# Patient Record
Sex: MALE | Race: WHITE | Employment: FULL TIME | URBAN - METROPOLITAN AREA
[De-identification: names, ages, dates, MRNs, and addresses within clinical notes are randomized per-mention and may not be internally consistent; named-entity substitution may affect disease eponyms.]

---

## 2018-12-12 ENCOUNTER — HOSPITAL PATHOLOGY (OUTPATIENT)
Dept: OTHER | Facility: CLINIC | Age: 48
End: 2018-12-12

## 2018-12-14 LAB — COPATH REPORT: NORMAL

## 2019-12-09 ENCOUNTER — HEALTH MAINTENANCE LETTER (OUTPATIENT)
Age: 49
End: 2019-12-09

## 2020-04-09 ENCOUNTER — MEDICAL CORRESPONDENCE (OUTPATIENT)
Dept: HEALTH INFORMATION MANAGEMENT | Facility: CLINIC | Age: 50
End: 2020-04-09

## 2020-04-09 DIAGNOSIS — Z98.890 HISTORY OF AORTIC ROOT REPAIR: Primary | ICD-10-CM

## 2020-10-02 ENCOUNTER — HOSPITAL ENCOUNTER (OUTPATIENT)
Dept: CARDIOLOGY | Facility: CLINIC | Age: 50
Discharge: HOME | End: 2020-10-02
Attending: FAMILY MEDICINE | Admitting: FAMILY MEDICINE
Payer: COMMERCIAL

## 2020-10-02 DIAGNOSIS — Z98.890 HISTORY OF AORTIC ROOT REPAIR: ICD-10-CM

## 2020-10-02 PROCEDURE — 999N000208 ECHOCARDIOGRAM COMPLETE

## 2020-10-02 PROCEDURE — 255N000002 HC RX 255 OP 636: Performed by: FAMILY MEDICINE

## 2020-10-02 PROCEDURE — 93306 TTE W/DOPPLER COMPLETE: CPT | Mod: 26 | Performed by: INTERNAL MEDICINE

## 2020-10-02 RX ADMIN — HUMAN ALBUMIN MICROSPHERES AND PERFLUTREN 9 ML: 10; .22 INJECTION, SOLUTION INTRAVENOUS at 09:52

## 2021-01-15 ENCOUNTER — HEALTH MAINTENANCE LETTER (OUTPATIENT)
Age: 51
End: 2021-01-15

## 2021-10-24 ENCOUNTER — HEALTH MAINTENANCE LETTER (OUTPATIENT)
Age: 51
End: 2021-10-24

## 2022-02-13 ENCOUNTER — HEALTH MAINTENANCE LETTER (OUTPATIENT)
Age: 52
End: 2022-02-13

## 2022-02-24 ENCOUNTER — ANCILLARY PROCEDURE (OUTPATIENT)
Dept: GENERAL RADIOLOGY | Facility: CLINIC | Age: 52
End: 2022-02-24
Attending: ORTHOPAEDIC SURGERY
Payer: COMMERCIAL

## 2022-02-24 DIAGNOSIS — S86.011A ACHILLES TENDON TEAR, RIGHT, INITIAL ENCOUNTER: Primary | ICD-10-CM

## 2022-02-24 DIAGNOSIS — S86.011A ACHILLES TENDON TEAR, RIGHT, INITIAL ENCOUNTER: ICD-10-CM

## 2022-02-24 PROCEDURE — 73620 X-RAY EXAM OF FOOT: CPT | Mod: RT | Performed by: RADIOLOGY

## 2022-03-01 ENCOUNTER — VIRTUAL VISIT (OUTPATIENT)
Dept: ORTHOPEDICS | Facility: CLINIC | Age: 52
End: 2022-03-01
Payer: COMMERCIAL

## 2022-03-01 DIAGNOSIS — M25.571 PAIN IN JOINT, ANKLE AND FOOT, RIGHT: Primary | ICD-10-CM

## 2022-03-01 PROCEDURE — 99204 OFFICE O/P NEW MOD 45 MIN: CPT | Mod: 95 | Performed by: ORTHOPAEDIC SURGERY

## 2022-03-01 ASSESSMENT — ENCOUNTER SYMPTOMS
NECK PAIN: 0
BACK PAIN: 0
STIFFNESS: 1
JOINT SWELLING: 0
ARTHRALGIAS: 1
MUSCLE CRAMPS: 0
MYALGIAS: 0
MUSCLE WEAKNESS: 0

## 2022-03-01 NOTE — PROGRESS NOTES
TELEPHONE VISIT    CHIEF COMPLAINT:  Right heel pain.    HISTORY OF PRESENT ILLNESS:  Mr. Mathis was interviewed via phone, although he was also examined on the previous day.  The patient reports to have a 6-month history of pain and discomfort along the posterior aspect of the heel.  He in fact reports to have pain in both heels.  However, the right one seems to be much worse than the left one.  Patient denies any history of trauma.  He has been aware of having some prominence on the posterior aspect of the heel.    He has tried conservative treatment options in the form of ice and activity modifications and anti-inflammatory medication without any significant success.  The patient reports to have pain pretty much on a daily basis as well as to have any interference of work as any degree of rise will create a tremendous amount of discomfort to restart and resume activities.    He enjoys fishing and hunting for recreational purposes, which also seems to interfere with his ability to do activities.  Reports to recently have gone on a trip to [place] and to have had a fair amount of pain and discomfort the following day after doing some walking at the beach.  The patient is interested in undergoing a definitive solution even if that implies a surgical intervention.    PAST MEDICAL HISTORY:  Overweight.    PAST SURGICAL HISTORY:  Reviewed today.    DRUG ALLERGIES:  None.    CURRENT MEDICATIONS:  Please refer to encounter form.    PHYSICAL EXAMINATION:  On today's visit, he presents as a very pleasant male in no apparent distress.  Denies to have any constitutional symptoms.    As mentioned above, I will report on exam performed the previous day, which is significant for having full range of motion of the right ankle, hindfoot and midfoot joints.  CMS intact.  Skin is intact.  Presents with no pain with palpation of Achilles tendon.  There is a fair amount of pain and discomfort with palpation of the posterior aspect of  the calcaneus tuberosity where the osteophyte is located.  Forefoot exam is unremarkable.    IMAGING:  Plain x-rays from a previous visit were reviewed today, which were significant for showing 3 views of the right foot with a fairly prominent osteophyte across the posterior aspect of the calcaneus tuberosity.  Otherwise, there are no acute findings, although he presented with some diffuse osteoarthritis across the midfoot region as well.    ASSESSMENT:  Right Achilles insertional calcific tendinitis.    PLAN:  I discussed with the patient that at this point, given the size of a osteophyte, the most reasonable approach will consist of undergoing an Achilles tendon debridement with partial calcaneus excision and subsequent reattachment.  I discussed with him the most likely postoperative course and complications from such intervention, which include but are not limited to infection, bleeding, nerve damage, residual pain and stiffness.    All questions were answered.  Patient was pleased with the discussion.  The patient will schedule surgery at the best of his convenience, and in the meantime, he has no activity restrictions.    TT:  30 minutes.  CT:  20 minutes.

## 2022-03-01 NOTE — LETTER
3/1/2022         RE: Sheldon Mathis  [address]  [facility] [address]        Dear Colleague,    Thank you for referring your patient, Sheldon Mathis, to the [facility] [place]. Please see a copy of my visit note below.    TELEPHONE VISIT    CHIEF COMPLAINT:  Right heel pain.    HISTORY OF PRESENT ILLNESS:  Mr. Mathsi was interviewed via phone, although he was also examined on the previous day.  The patient reports to have a 6-month history of pain and discomfort along the posterior aspect of the heel.  He in fact reports to have pain in both heels.  However, the right one seems to be much worse than the left one.  Patient denies any history of trauma.  He has been aware of having some prominence on the posterior aspect of the heel.    He has tried conservative treatment options in the form of ice and activity modifications and anti-inflammatory medication without any significant success.  The patient reports to have pain pretty much on a daily basis as well as to have any interference of work as any degree of rise will create a tremendous amount of discomfort to restart and resume activities.    He enjoys fishing and hunting for recreational purposes, which also seems to interfere with his ability to do activities.  Reports to recently have gone on a trip to [place] and to have had a fair amount of pain and discomfort the following day after doing some walking at the beach.  The patient is interested in undergoing a definitive solution even if that implies a surgical intervention.    PAST MEDICAL HISTORY:  Overweight.    PAST SURGICAL HISTORY:  Reviewed today.    DRUG ALLERGIES:  None.    CURRENT MEDICATIONS:  Please refer to encounter form.    PHYSICAL EXAMINATION:  On today's visit, he presents as a very pleasant male in no apparent distress.  Denies to have any constitutional symptoms.    As mentioned above, I will report on exam performed the previous day, which  is significant for having full range of motion of the right ankle, hindfoot and midfoot joints.  CMS intact.  Skin is intact.  Presents with no pain with palpation of Achilles tendon.  There is a fair amount of pain and discomfort with palpation of the posterior aspect of the calcaneus tuberosity where the osteophyte is located.  Forefoot exam is unremarkable.    IMAGING:  Plain x-rays from a previous visit were reviewed today, which were significant for showing 3 views of the right foot with a fairly prominent osteophyte across the posterior aspect of the calcaneus tuberosity.  Otherwise, there are no acute findings, although he presented with some diffuse osteoarthritis across the midfoot region as well.    ASSESSMENT:  Right Achilles insertional calcific tendinitis.    PLAN:  I discussed with the patient that at this point, given the size of a osteophyte, the most reasonable approach will consist of undergoing an Achilles tendon debridement with partial calcaneus excision and subsequent reattachment.  I discussed with him the most likely postoperative course and complications from such intervention, which include but are not limited to infection, bleeding, nerve damage, residual pain and stiffness.    All questions were answered.  Patient was pleased with the discussion.  The patient will schedule surgery at the best of his convenience, and in the meantime, he has no activity restrictions.    TT:  30 minutes.  CT:  20 minutes.      Gokul Merchant MD

## 2022-03-08 DIAGNOSIS — M25.571 PAIN IN JOINT, ANKLE AND FOOT, RIGHT: Primary | ICD-10-CM

## 2022-03-29 ENCOUNTER — MYC MEDICAL ADVICE (OUTPATIENT)
Dept: ORTHOPEDICS | Facility: CLINIC | Age: 52
End: 2022-03-29
Payer: COMMERCIAL

## 2022-04-05 ENCOUNTER — DOCUMENTATION ONLY (OUTPATIENT)
Dept: ORTHOPEDICS | Facility: CLINIC | Age: 52
End: 2022-04-05
Payer: COMMERCIAL

## 2022-04-05 NOTE — PROGRESS NOTES
FMLA and workability forms complete and faxed to [facility] [phone]  Also scanned to pt email  Sent to be scanned into chart    Nadja Preciado

## 2022-04-24 DIAGNOSIS — Z11.59 ENCOUNTER FOR SCREENING FOR OTHER VIRAL DISEASES: Primary | ICD-10-CM

## 2022-05-02 ENCOUNTER — TRANSFERRED RECORDS (OUTPATIENT)
Dept: HEALTH INFORMATION MANAGEMENT | Facility: CLINIC | Age: 52
End: 2022-05-02
Payer: COMMERCIAL

## 2022-05-02 DIAGNOSIS — S86.011A ACHILLES TENDON TEAR, RIGHT, INITIAL ENCOUNTER: Primary | ICD-10-CM

## 2022-05-22 ENCOUNTER — LAB (OUTPATIENT)
Dept: LAB | Facility: CLINIC | Age: 52
End: 2022-05-22
Attending: ORTHOPAEDIC SURGERY
Payer: COMMERCIAL

## 2022-05-22 DIAGNOSIS — Z11.59 ENCOUNTER FOR SCREENING FOR OTHER VIRAL DISEASES: ICD-10-CM

## 2022-05-22 PROCEDURE — U0003 INFECTIOUS AGENT DETECTION BY NUCLEIC ACID (DNA OR RNA); SEVERE ACUTE RESPIRATORY SYNDROME CORONAVIRUS 2 (SARS-COV-2) (CORONAVIRUS DISEASE [COVID-19]), AMPLIFIED PROBE TECHNIQUE, MAKING USE OF HIGH THROUGHPUT TECHNOLOGIES AS DESCRIBED BY CMS-2020-01-R: HCPCS

## 2022-05-22 PROCEDURE — U0005 INFEC AGEN DETEC AMPLI PROBE: HCPCS

## 2022-05-23 LAB — SARS-COV-2 RNA RESP QL NAA+PROBE: NEGATIVE

## 2022-05-23 RX ORDER — OMEPRAZOLE 20 MG/1
20 TABLET, DELAYED RELEASE ORAL DAILY
COMMUNITY

## 2022-05-23 RX ORDER — LOSARTAN POTASSIUM AND HYDROCHLOROTHIAZIDE 25; 100 MG/1; MG/1
1 TABLET ORAL DAILY
COMMUNITY

## 2022-05-23 RX ORDER — ATORVASTATIN CALCIUM 10 MG/1
TABLET, FILM COATED ORAL
COMMUNITY
Start: 2022-04-28

## 2022-05-23 RX ORDER — METOPROLOL SUCCINATE 100 MG/1
100 TABLET, EXTENDED RELEASE ORAL DAILY
COMMUNITY
Start: 2022-02-24

## 2022-05-24 ENCOUNTER — ANESTHESIA EVENT (OUTPATIENT)
Dept: SURGERY | Facility: CLINIC | Age: 52
End: 2022-05-24
Payer: COMMERCIAL

## 2022-05-25 ENCOUNTER — ANESTHESIA (OUTPATIENT)
Dept: SURGERY | Facility: CLINIC | Age: 52
End: 2022-05-25
Payer: COMMERCIAL

## 2022-05-25 ENCOUNTER — HOSPITAL ENCOUNTER (OUTPATIENT)
Facility: CLINIC | Age: 52
Discharge: HOME | End: 2022-05-25
Attending: ORTHOPAEDIC SURGERY | Admitting: ORTHOPAEDIC SURGERY
Payer: COMMERCIAL

## 2022-05-25 VITALS
DIASTOLIC BLOOD PRESSURE: 73 MMHG | SYSTOLIC BLOOD PRESSURE: 111 MMHG | TEMPERATURE: 97.7 F | HEIGHT: 70 IN | HEART RATE: 57 BPM | WEIGHT: 308.42 LBS | BODY MASS INDEX: 44.15 KG/M2 | RESPIRATION RATE: 15 BRPM | OXYGEN SATURATION: 98 %

## 2022-05-25 DIAGNOSIS — M25.571 ACUTE RIGHT ANKLE PAIN: Primary | ICD-10-CM

## 2022-05-25 LAB — GLUCOSE BLDC GLUCOMTR-MCNC: 109 MG/DL (ref 70–99)

## 2022-05-25 PROCEDURE — 258N000003 HC RX IP 258 OP 636: Performed by: NURSE ANESTHETIST, CERTIFIED REGISTERED

## 2022-05-25 PROCEDURE — 710N000010 HC RECOVERY PHASE 1, LEVEL 2, PER MIN: Performed by: ORTHOPAEDIC SURGERY

## 2022-05-25 PROCEDURE — 250N000011 HC RX IP 250 OP 636: Performed by: NURSE ANESTHETIST, CERTIFIED REGISTERED

## 2022-05-25 PROCEDURE — 999N000141 HC STATISTIC PRE-PROCEDURE NURSING ASSESSMENT: Performed by: ORTHOPAEDIC SURGERY

## 2022-05-25 PROCEDURE — 370N000017 HC ANESTHESIA TECHNICAL FEE, PER MIN: Performed by: ORTHOPAEDIC SURGERY

## 2022-05-25 PROCEDURE — 82962 GLUCOSE BLOOD TEST: CPT

## 2022-05-25 PROCEDURE — 271N000001 HC OR GENERAL SUPPLY NON-STERILE: Performed by: ORTHOPAEDIC SURGERY

## 2022-05-25 PROCEDURE — C1713 ANCHOR/SCREW BN/BN,TIS/BN: HCPCS | Performed by: ORTHOPAEDIC SURGERY

## 2022-05-25 PROCEDURE — 250N000009 HC RX 250: Performed by: ORTHOPAEDIC SURGERY

## 2022-05-25 PROCEDURE — 28120 PART REMOVAL OF ANKLE/HEEL: CPT | Mod: RT | Performed by: ORTHOPAEDIC SURGERY

## 2022-05-25 PROCEDURE — 250N000011 HC RX IP 250 OP 636: Performed by: ORTHOPAEDIC SURGERY

## 2022-05-25 PROCEDURE — 250N000009 HC RX 250: Performed by: ANESTHESIOLOGY

## 2022-05-25 PROCEDURE — 272N000001 HC OR GENERAL SUPPLY STERILE: Performed by: ORTHOPAEDIC SURGERY

## 2022-05-25 PROCEDURE — 27630 REMOVAL OF TENDON LESION: CPT | Mod: RT | Performed by: ORTHOPAEDIC SURGERY

## 2022-05-25 PROCEDURE — 360N000084 HC SURGERY LEVEL 4 W/ FLUORO, PER MIN: Performed by: ORTHOPAEDIC SURGERY

## 2022-05-25 PROCEDURE — 250N000011 HC RX IP 250 OP 636: Performed by: STUDENT IN AN ORGANIZED HEALTH CARE EDUCATION/TRAINING PROGRAM

## 2022-05-25 PROCEDURE — 250N000011 HC RX IP 250 OP 636: Performed by: ANESTHESIOLOGY

## 2022-05-25 PROCEDURE — 250N000009 HC RX 250: Performed by: NURSE ANESTHETIST, CERTIFIED REGISTERED

## 2022-05-25 PROCEDURE — 710N000012 HC RECOVERY PHASE 2, PER MINUTE: Performed by: ORTHOPAEDIC SURGERY

## 2022-05-25 DEVICE — IMPLANTABLE DEVICE: Type: IMPLANTABLE DEVICE | Site: ANKLE | Status: FUNCTIONAL

## 2022-05-25 RX ORDER — MAGNESIUM HYDROXIDE 1200 MG/15ML
LIQUID ORAL PRN
Status: DISCONTINUED | OUTPATIENT
Start: 2022-05-25 | End: 2022-05-25 | Stop reason: HOSPADM

## 2022-05-25 RX ORDER — OXYCODONE HYDROCHLORIDE 5 MG/1
5 TABLET ORAL
Status: DISCONTINUED | OUTPATIENT
Start: 2022-05-25 | End: 2022-05-25 | Stop reason: HOSPADM

## 2022-05-25 RX ORDER — SODIUM CHLORIDE, SODIUM LACTATE, POTASSIUM CHLORIDE, CALCIUM CHLORIDE 600; 310; 30; 20 MG/100ML; MG/100ML; MG/100ML; MG/100ML
INJECTION, SOLUTION INTRAVENOUS CONTINUOUS
Status: DISCONTINUED | OUTPATIENT
Start: 2022-05-25 | End: 2022-05-25 | Stop reason: HOSPADM

## 2022-05-25 RX ORDER — DEXAMETHASONE SODIUM PHOSPHATE 10 MG/ML
INJECTION, SOLUTION INTRAMUSCULAR; INTRAVENOUS
Status: COMPLETED | OUTPATIENT
Start: 2022-05-25 | End: 2022-05-25

## 2022-05-25 RX ORDER — HYDROMORPHONE HYDROCHLORIDE 1 MG/ML
0.2 INJECTION, SOLUTION INTRAMUSCULAR; INTRAVENOUS; SUBCUTANEOUS EVERY 5 MIN PRN
Status: DISCONTINUED | OUTPATIENT
Start: 2022-05-25 | End: 2022-05-25 | Stop reason: HOSPADM

## 2022-05-25 RX ORDER — OXYCODONE HYDROCHLORIDE 5 MG/1
5-10 TABLET ORAL EVERY 4 HOURS PRN
Qty: 20 TABLET | Refills: 0 | Status: SHIPPED | OUTPATIENT
Start: 2022-05-25

## 2022-05-25 RX ORDER — OXYCODONE HYDROCHLORIDE 5 MG/1
5 TABLET ORAL EVERY 4 HOURS PRN
Status: DISCONTINUED | OUTPATIENT
Start: 2022-05-25 | End: 2022-05-25 | Stop reason: HOSPADM

## 2022-05-25 RX ORDER — ONDANSETRON 4 MG/1
4 TABLET, ORALLY DISINTEGRATING ORAL EVERY 30 MIN PRN
Status: DISCONTINUED | OUTPATIENT
Start: 2022-05-25 | End: 2022-05-25 | Stop reason: HOSPADM

## 2022-05-25 RX ORDER — FENTANYL CITRATE 50 UG/ML
25 INJECTION, SOLUTION INTRAMUSCULAR; INTRAVENOUS
Status: DISCONTINUED | OUTPATIENT
Start: 2022-05-25 | End: 2022-05-25 | Stop reason: HOSPADM

## 2022-05-25 RX ORDER — CEFAZOLIN SODIUM/WATER 3 G/30 ML
3 SYRINGE (ML) INTRAVENOUS
Status: COMPLETED | OUTPATIENT
Start: 2022-05-25 | End: 2022-05-25

## 2022-05-25 RX ORDER — CEFAZOLIN SODIUM/WATER 2 G/20 ML
2 SYRINGE (ML) INTRAVENOUS
Status: DISCONTINUED | OUTPATIENT
Start: 2022-05-25 | End: 2022-05-25

## 2022-05-25 RX ORDER — DEXMEDETOMIDINE HYDROCHLORIDE 4 UG/ML
INJECTION, SOLUTION INTRAVENOUS
Status: COMPLETED | OUTPATIENT
Start: 2022-05-25 | End: 2022-05-25

## 2022-05-25 RX ORDER — CEFAZOLIN SODIUM/WATER 2 G/20 ML
2 SYRINGE (ML) INTRAVENOUS SEE ADMIN INSTRUCTIONS
Status: DISCONTINUED | OUTPATIENT
Start: 2022-05-25 | End: 2022-05-25 | Stop reason: HOSPADM

## 2022-05-25 RX ORDER — ONDANSETRON 2 MG/ML
4 INJECTION INTRAMUSCULAR; INTRAVENOUS EVERY 30 MIN PRN
Status: DISCONTINUED | OUTPATIENT
Start: 2022-05-25 | End: 2022-05-25 | Stop reason: HOSPADM

## 2022-05-25 RX ORDER — SODIUM CHLORIDE, SODIUM LACTATE, POTASSIUM CHLORIDE, CALCIUM CHLORIDE 600; 310; 30; 20 MG/100ML; MG/100ML; MG/100ML; MG/100ML
INJECTION, SOLUTION INTRAVENOUS CONTINUOUS PRN
Status: DISCONTINUED | OUTPATIENT
Start: 2022-05-25 | End: 2022-05-25

## 2022-05-25 RX ORDER — BUPIVACAINE HYDROCHLORIDE 2.5 MG/ML
INJECTION, SOLUTION EPIDURAL; INFILTRATION; INTRACAUDAL
Status: COMPLETED | OUTPATIENT
Start: 2022-05-25 | End: 2022-05-25

## 2022-05-25 RX ORDER — FENTANYL CITRATE 50 UG/ML
25-50 INJECTION, SOLUTION INTRAMUSCULAR; INTRAVENOUS
Status: DISCONTINUED | OUTPATIENT
Start: 2022-05-25 | End: 2022-05-25 | Stop reason: HOSPADM

## 2022-05-25 RX ORDER — NALOXONE HYDROCHLORIDE 0.4 MG/ML
0.4 INJECTION, SOLUTION INTRAMUSCULAR; INTRAVENOUS; SUBCUTANEOUS
Status: DISCONTINUED | OUTPATIENT
Start: 2022-05-25 | End: 2022-05-25 | Stop reason: HOSPADM

## 2022-05-25 RX ORDER — HYDROXYZINE HYDROCHLORIDE 25 MG/1
25 TABLET, FILM COATED ORAL EVERY 8 HOURS PRN
Qty: 20 TABLET | Refills: 0 | Status: SHIPPED | OUTPATIENT
Start: 2022-05-25

## 2022-05-25 RX ORDER — PROPOFOL 10 MG/ML
INJECTION, EMULSION INTRAVENOUS PRN
Status: DISCONTINUED | OUTPATIENT
Start: 2022-05-25 | End: 2022-05-25

## 2022-05-25 RX ORDER — HYDROXYZINE HYDROCHLORIDE 25 MG/1
25 TABLET, FILM COATED ORAL
Status: DISCONTINUED | OUTPATIENT
Start: 2022-05-25 | End: 2022-05-25 | Stop reason: HOSPADM

## 2022-05-25 RX ORDER — PROPOFOL 10 MG/ML
INJECTION, EMULSION INTRAVENOUS CONTINUOUS PRN
Status: DISCONTINUED | OUTPATIENT
Start: 2022-05-25 | End: 2022-05-25

## 2022-05-25 RX ORDER — NALOXONE HYDROCHLORIDE 0.4 MG/ML
0.2 INJECTION, SOLUTION INTRAMUSCULAR; INTRAVENOUS; SUBCUTANEOUS
Status: DISCONTINUED | OUTPATIENT
Start: 2022-05-25 | End: 2022-05-25 | Stop reason: HOSPADM

## 2022-05-25 RX ORDER — ACETAMINOPHEN 325 MG/1
650 TABLET ORAL
Status: DISCONTINUED | OUTPATIENT
Start: 2022-05-25 | End: 2022-05-25 | Stop reason: HOSPADM

## 2022-05-25 RX ORDER — LIDOCAINE HYDROCHLORIDE 20 MG/ML
INJECTION, SOLUTION INFILTRATION; PERINEURAL PRN
Status: DISCONTINUED | OUTPATIENT
Start: 2022-05-25 | End: 2022-05-25

## 2022-05-25 RX ORDER — FLUMAZENIL 0.1 MG/ML
0.2 INJECTION, SOLUTION INTRAVENOUS
Status: DISCONTINUED | OUTPATIENT
Start: 2022-05-25 | End: 2022-05-25 | Stop reason: HOSPADM

## 2022-05-25 RX ORDER — GABAPENTIN 100 MG/1
300 CAPSULE ORAL
Status: DISCONTINUED | OUTPATIENT
Start: 2022-05-25 | End: 2022-05-25 | Stop reason: HOSPADM

## 2022-05-25 RX ORDER — MEPERIDINE HYDROCHLORIDE 25 MG/ML
12.5 INJECTION INTRAMUSCULAR; INTRAVENOUS; SUBCUTANEOUS
Status: DISCONTINUED | OUTPATIENT
Start: 2022-05-25 | End: 2022-05-25 | Stop reason: HOSPADM

## 2022-05-25 RX ORDER — DEXAMETHASONE SODIUM PHOSPHATE 4 MG/ML
INJECTION, SOLUTION INTRA-ARTICULAR; INTRALESIONAL; INTRAMUSCULAR; INTRAVENOUS; SOFT TISSUE PRN
Status: DISCONTINUED | OUTPATIENT
Start: 2022-05-25 | End: 2022-05-25

## 2022-05-25 RX ORDER — FENTANYL CITRATE 50 UG/ML
25 INJECTION, SOLUTION INTRAMUSCULAR; INTRAVENOUS EVERY 5 MIN PRN
Status: DISCONTINUED | OUTPATIENT
Start: 2022-05-25 | End: 2022-05-25 | Stop reason: HOSPADM

## 2022-05-25 RX ORDER — ACETAMINOPHEN 325 MG/1
975 TABLET ORAL ONCE
Status: DISCONTINUED | OUTPATIENT
Start: 2022-05-25 | End: 2022-05-25 | Stop reason: HOSPADM

## 2022-05-25 RX ORDER — LIDOCAINE 40 MG/G
CREAM TOPICAL
Status: DISCONTINUED | OUTPATIENT
Start: 2022-05-25 | End: 2022-05-25 | Stop reason: HOSPADM

## 2022-05-25 RX ADMIN — DEXAMETHASONE SODIUM PHOSPHATE 1 MG: 10 INJECTION, SOLUTION INTRAMUSCULAR; INTRAVENOUS at 06:46

## 2022-05-25 RX ADMIN — PHENYLEPHRINE HYDROCHLORIDE 100 MCG: 10 INJECTION INTRAVENOUS at 08:09

## 2022-05-25 RX ADMIN — BUPIVACAINE HYDROCHLORIDE 15 ML: 2.5 INJECTION, SOLUTION EPIDURAL; INFILTRATION; INTRACAUDAL at 06:46

## 2022-05-25 RX ADMIN — PROPOFOL 20 MG: 10 INJECTION, EMULSION INTRAVENOUS at 07:31

## 2022-05-25 RX ADMIN — CEFAZOLIN SODIUM 3 G: 10 INJECTION, POWDER, FOR SOLUTION INTRAVENOUS at 07:20

## 2022-05-25 RX ADMIN — PROPOFOL 20 MG: 10 INJECTION, EMULSION INTRAVENOUS at 07:34

## 2022-05-25 RX ADMIN — DEXAMETHASONE SODIUM PHOSPHATE 8 MG: 4 INJECTION, SOLUTION INTRAMUSCULAR; INTRAVENOUS at 07:46

## 2022-05-25 RX ADMIN — SODIUM CHLORIDE, POTASSIUM CHLORIDE, SODIUM LACTATE AND CALCIUM CHLORIDE: 600; 310; 30; 20 INJECTION, SOLUTION INTRAVENOUS at 07:20

## 2022-05-25 RX ADMIN — PHENYLEPHRINE HYDROCHLORIDE 100 MCG: 10 INJECTION INTRAVENOUS at 08:14

## 2022-05-25 RX ADMIN — MIDAZOLAM HYDROCHLORIDE 1 MG: 1 INJECTION, SOLUTION INTRAMUSCULAR; INTRAVENOUS at 06:40

## 2022-05-25 RX ADMIN — FENTANYL CITRATE 50 MCG: 50 INJECTION, SOLUTION INTRAMUSCULAR; INTRAVENOUS at 06:40

## 2022-05-25 RX ADMIN — PROPOFOL 100 MCG/KG/MIN: 10 INJECTION, EMULSION INTRAVENOUS at 07:31

## 2022-05-25 RX ADMIN — DEXMEDETOMIDINE 20 MCG: 100 INJECTION, SOLUTION, CONCENTRATE INTRAVENOUS at 06:40

## 2022-05-25 RX ADMIN — DEXAMETHASONE SODIUM PHOSPHATE 1 MG: 10 INJECTION, SOLUTION INTRAMUSCULAR; INTRAVENOUS at 06:40

## 2022-05-25 RX ADMIN — DEXMEDETOMIDINE HYDROCHLORIDE 20 MCG: 4 INJECTION, SOLUTION INTRAVENOUS at 06:46

## 2022-05-25 RX ADMIN — MIDAZOLAM 2 MG: 1 INJECTION INTRAMUSCULAR; INTRAVENOUS at 07:24

## 2022-05-25 RX ADMIN — BUPIVACAINE HYDROCHLORIDE 25 ML: 2.5 INJECTION, SOLUTION EPIDURAL; INFILTRATION; INTRACAUDAL at 06:40

## 2022-05-25 RX ADMIN — LIDOCAINE HYDROCHLORIDE 60 MG: 20 INJECTION, SOLUTION INFILTRATION; PERINEURAL at 07:31

## 2022-05-25 RX ADMIN — PHENYLEPHRINE HYDROCHLORIDE 50 MCG: 10 INJECTION INTRAVENOUS at 08:02

## 2022-05-25 NOTE — ANESTHESIA PROCEDURE NOTES
Sciatic Procedure Note    Pre-Procedure   Staff -        Anesthesiologist:  Frantz Singer DO       Performed By: anesthesiologist       Location: pre-op       Procedure Start/Stop Times: 5/25/2022 6:40 AM and 5/25/2022 6:45 AM       Pre-Anesthestic Checklist: patient identified, IV checked, site marked, risks and benefits discussed, informed consent, monitors and equipment checked, pre-op evaluation, at physician/surgeon's request and post-op pain management  Timeout:       Correct Patient: Yes        Correct Procedure: Yes        Correct Site: Yes        Correct Position: Yes        Correct Laterality: Yes        Site Marked: Yes  Procedure Documentation  Procedure: Sciatic       Diagnosis: POST OPERATIVE PAIN       Laterality: right       Patient Position: supine       Patient Prep/Sterile Barriers: sterile gloves, mask       Skin prep: Chloraprep       Local skin infiltrated with 3 mL of 1% lidocaine.  (popliteal approach).       Needle Type: short bevel       Needle Gauge: 21.        Needle Length (millimeters): 110        Ultrasound guided       1. Ultrasound was used to identify targeted nerve, plexus, vascular marker, or fascial plane and place a needle adjacent to it in real-time.       2. Ultrasound was used to visualize the spread of anesthetic in close proximity to the above referenced structure.       3. A permanent image is entered into the patient's record.       4. The visualized anatomic structures appeared normal.       5. There were no apparent abnormal pathologic findings.    Assessment/Narrative         The placement was negative for: blood aspirated, painful injection and site bleeding       Paresthesias: No.       Bolus given via needle..        Secured via.        Insertion/Infusion Method: Single Shot       Complications: none       Injection made incrementally with aspirations every 5 mL.    Medication(s) Administered   Bupivacaine 0.25% PF (Infiltration) - Infiltration   25 mL -  5/25/2022 6:40:00 AM  Dexmedetomidine 4 mcg/mL (Perineural) - Perineural   20 mcg - 5/25/2022 6:40:00 AM  Dexamethasone 10 mg/mL PF (Perineural) - Perineural   1 mg - 5/25/2022 6:40:00 AM  Medication Administration Time: 5/25/2022 6:40 AM     Comments:  Informed consent obtained.  All risks and benefits of the nerve block discussed with the patient.  All questions answered and all parties agreed with the plan.   Block was placed at the surgeon's request for post operative pain control.

## 2022-05-25 NOTE — ANESTHESIA POSTPROCEDURE EVALUATION
Patient: Sheldon Mathis    Procedure: Procedure(s):  Right achilles debridement and partial calcaneus osteotomy       Anesthesia Type:  MAC    Note:  Disposition: Outpatient   Postop Pain Control: Uneventful            Sign Out: Well controlled pain   PONV: No   Neuro/Psych: Uneventful            Sign Out: Acceptable/Baseline neuro status   Airway/Respiratory: Uneventful            Sign Out: AIRWAY IN SITU/Resp. Support   CV/Hemodynamics: Uneventful            Sign Out: Acceptable CV status   Other NRE: NONE   DID A NON-ROUTINE EVENT OCCUR? No           Last vitals:  Vitals Value Taken Time   /60 05/25/22 0900   Temp 36.8  C (98.2  F) 05/25/22 0825   Pulse 58 05/25/22 0906   Resp 9 05/25/22 0906   SpO2 96 % 05/25/22 0906   Vitals shown include unvalidated device data.    Electronically Signed By: Frantz Singer DO  May 25, 2022  9:08 AM

## 2022-05-25 NOTE — OP NOTE
Procedure Date: 05/25/2022    PREOPERATIVE DIAGNOSIS:  Right Achilles insertional calcific tendinitis.    POSTOPERATIVE DIAGNOSIS:  Right Achilles insertional calcific tendinitis.    PROCEDURE:  1.  Right Achilles tendon debridement.  2.  Right calcaneus partial excision.  3.  Right Achilles tendon reattachment.    SURGEON:  Gokul Merchant MD    ASSISTANT:  Neris Means PA-C.   Ms. Means's assistance was required in order to provide assistance with positioning, the surgery itself, holding retractors, closure of the wound and application of immobilization devices.  At the time of the surgery, there was no available help from an orthopedic trainee.    COMPLICATIONS:  None.    DRAINS:  None.    ESTIMATED BLOOD LOSS:  Less than 20 mL    ANESTHESIA:  IV sedation.    INDICATIONS FOR PROCEDURE:  Please refer to clinic note for further details.  Discussed indications of Mr. Mathis's case.    DESCRIPTION OF PROCEDURE:  On 05/25/2022, the patient was taken to surgery.  Preoperative antibiotics were administered to the patient prior to arrival to the OR.     After successful induction of IV sedation, the patient was placed supine on the operating table.  The right lower extremity was prepped and draped in sterile fashion.  After exsanguination by gravity, tourniquet cuff was inflated to 300 mmHg on the proximal third of the right thigh.    The pause for the cause was performed according to institution's policy, which confirmed laterality of the procedure.    An incision was made on the posterior midline across the right heel.  Subcutaneous tissues were dissected.  The Achilles tendon was identified and it was detached from the calcaneus tuberosity.  The Achilles tendon was quite thickened and proceed with debridement of the most anterior half of the tendon with a #10 surgical blade in order to expose healthy collagen fibers.    With an oscillating saw, we proceeded with resection of the most posterior and superior  aspect of the calcaneus tuberosity.  Special attention was placed to avoid any sharp edges across the medial and lateral wall of the calcaneus.    Eventually proceeded with placement of a 4.5 mm bioabsorbable corkscrew across the calcaneus tuberosity with a total of 8 strands of suture material.  Eventually, we proceeded with reattachment of Achilles tendon to the calcaneus tuberosity with excellent contact surface.  The patient presented with a resting position of approximately 10 degrees of plantar flexion once the tendon was reattached.    Tourniquet cuff was deflated.  Satisfactory hemostasis was accomplished.  Wound was closed in layers.  Sterile dressings were applied.  The patient was placed in a short leg cast in a resting position, which again is approximately 10 degrees of plantar flexion and transferred in stable condition to PACU.    PLAN:  The patient will remain nonweightbearing x6 weeks.  He will return to clinic in 2 weeks for a wound check.  At that time, sutures will be removed if indicated and he will be placed in a second short leg cast in slightly more dorsiflexion, which will be exchanged at 4 weeks from surgery.    The patient will err into too much plantar flexion and dorsiflexion during the early postoperative course.    At the 6-week appointment, the cast will be removed, no x-rays will be obtained, and based on clinical findings, further recommendations will be given to the patient.    The patient will not pursue any physical therapy for the first 6 weeks from surgery.    Gokul Merchant MD        D: 2022   T: 2022   MT: md    Name:     RANDELL LOVE  MRN:      [id number]        Account:        [id number]   :      1970           Procedure Date: 2022     Document: [id number]

## 2022-05-25 NOTE — BRIEF OP NOTE
[facility]    Brief Operative Note    Pre-operative diagnosis: Pain in joint, ankle and foot, right [M25.571]  Post-operative diagnosis Same as pre-operative diagnosis    Procedure: Procedure(s):  Right achilles debridement and partial calcaneus excision  Surgeon: Surgeon(s) and Role:     * Gokul Merchant MD - Primary     * Neris Means PA-C - Assisting  Anesthesia: Choice   Estimated Blood Loss: 20 cc    Drains: None  Specimens: * No specimens in log *  Findings:   None.  Complications: None.  Implants:   Implant Name Type Inv. Item Serial No.  Lot No. LRB No. Used Action   IMP KIT LAMB ANCHOR BIOCOMP CORKSCREW 4.5X14MM FT [id number] - [id number] Metallic Hardware/Shade IMP KIT LAMB ANCHOR BIOCOMP CORKSCREW 4.5X14MM FT [id number]  [employer] [id number] Right 2 Implanted       Plan:  Same Day surgery discharge to home once criteria met.  Cast to remain on right  lower extremity and  NWB at all times.  Oxycodone for pain.  No dressing change on own.  Leave dressing on until 2 weeks follow up appointment.  F/U in clinic in 2 weeks    I was asked by Dr. Merchant to assist with surgery. I positioned and prepped the patient. I retracted soft tissue.   I suctioned fluids when needed. I provided traction for dissection. I helped to ligate blood vessels. I helped Dr. Merchant identify and protect important structures. The procedure was medically necessary for an assistant because Dr. Merchant needed the operative exposure and assistance that I provided. This allowed him to safely and efficiently operate. It was also important that I help ligate blood vessels to maintain hemostasis and reduce the bleeding risk. I helped with the closure of the operative incisions as well as helping with the boot/cast/splint.  The assistance that I provided reduced operative time which meant less general anesthetic for the patient. No qualified residents were available to  assist.    Neris Means PA-C

## 2022-05-25 NOTE — ANESTHESIA CARE TRANSFER NOTE
Patient: Sheldon Mathis    Procedure: Procedure(s):  Right achilles debridement and partial calcaneus osteotomy       Diagnosis: Pain in joint, ankle and foot, right [M25.571]  Diagnosis Additional Information: No value filed.    Anesthesia Type:   MAC     Note:    Oropharynx: oropharynx clear of all foreign objects  Level of Consciousness: drowsy  Oxygen Supplementation: face mask  Level of Supplemental Oxygen (L/min / FiO2): 8  Independent Airway: airway patency satisfactory and stable    Vital Signs Stable: post-procedure vital signs reviewed and stable  Report to RN Given: handoff report given  Patient transferred to: PACU    Handoff Report: Identifed the Patient, Identified the Reponsible Provider, Reviewed the pertinent medical history, Discussed the surgical course, Reviewed Intra-OP anesthesia mangement and issues during anesthesia, Set expectations for post-procedure period and Allowed opportunity for questions and acknowledgement of understanding      Vitals:  Vitals Value Taken Time   BP 91/57 05/25/22 0826   Temp 36.8  C (98.2  F) 05/25/22 0825   Pulse 69 05/25/22 0830   Resp 18 05/25/22 0830   SpO2 92 % 05/25/22 0830   Vitals shown include unvalidated device data.    Electronically Signed By: LYNN Silverman CRNA  May 25, 2022  8:31 AM

## 2022-05-25 NOTE — ANESTHESIA PREPROCEDURE EVALUATION
Anesthesia Pre-Procedure Evaluation    Patient: Sheldon Mathis   MRN: [id number] : 1970        Procedure : Procedure(s):  Right achilles debridement and partial calcaneus osteotomy          Past Medical History:   Diagnosis Date     Aortic root dilatation (H)      Hyperlipidemia LDL goal <100      Hypertension      Obese      Sleep apnea       History reviewed. No pertinent surgical history.   Allergies   Allergen Reactions     Penicillins Rash      Social History     Tobacco Use     Smoking status: Not on file     Smokeless tobacco: Not on file   Substance Use Topics     Alcohol use: Not on file      Wt Readings from Last 1 Encounters:   22 139.9 kg (308 lb 6.8 oz)        Anesthesia Evaluation            ROS/MED HX  ENT/Pulmonary:     (+) sleep apnea, uses CPAP,     Neurologic:  - neg neurologic ROS     Cardiovascular:     (+) Dyslipidemia hypertension-----Previous cardiac testing   Echo: Date: Results:  Interpretation Summary     1. The left ventricle is normal in structure, function and size. The visual  ejection fraction is estimated at 60%.  2. The right ventricle is normal in structure, function and size.  3. No valve disease.  4. Mild aortic root dilatation (4.3cm). Normal ascending, transverse (arch),  and descending aorta.     Echo from 2015 showed EF 60%, aortic root 4.3cm.  Stress Test: Date: Results:    ECG Reviewed: Date: Results:  NSR with frequent PACs  Cath: Date: Results:      METS/Exercise Tolerance: >4 METS    Hematologic:  - neg hematologic  ROS     Musculoskeletal:  - neg musculoskeletal ROS     GI/Hepatic:  - neg GI/hepatic ROS     Renal/Genitourinary:  - neg Renal ROS     Endo:     (+) Obesity,     Psychiatric/Substance Use:  - neg psychiatric ROS     Infectious Disease:  - neg infectious disease ROS     Malignancy:  - neg malignancy ROS     Other:  - neg other ROS          Physical Exam    Airway  airway exam normal      Mallampati: I   TM distance: > 3 FB   Neck ROM: full    Mouth opening: > 3 cm    Respiratory Devices and Support         Dental  no notable dental history         Cardiovascular   cardiovascular exam normal       Rhythm and rate: regular and normal     Pulmonary   pulmonary exam normal        breath sounds clear to auscultation           OUTSIDE LABS:  CBC: No results found for: WBC, HGB, HCT, PLT  BMP:   Lab Results   Component Value Date     (H) 05/25/2022     COAGS: No results found for: PTT, INR, FIBR  POC: No results found for: BGM, HCG, HCGS  HEPATIC: No results found for: ALBUMIN, PROTTOTAL, ALT, AST, GGT, ALKPHOS, BILITOTAL, BILIDIRECT, MANNY  OTHER: No results found for: PH, LACT, A1C, PATRICIA, PHOS, MAG, LIPASE, AMYLASE, TSH, T4, T3, CRP, SED    Anesthesia Plan    ASA Status:  2   NPO Status:  NPO Appropriate    Anesthesia Type: MAC.     - Reason for MAC: Deep or markedly invasive procedure (G8)   Induction: Propofol.   Maintenance: N/A.        Consents    Anesthesia Plan(s) and associated risks, benefits, and realistic alternatives discussed. Questions answered and patient/representative(s) expressed understanding.    - Discussed:     - Discussed with:  Patient    Use of blood products discussed: No .     Postoperative Care    Pain management: Multi-modal analgesia, Oral pain medications, Peripheral nerve block (Single Shot).   PONV prophylaxis: Ondansetron (or other 5HT-3), Dexamethasone or Solumedrol     Comments:                Frantz Singer DO

## 2022-05-25 NOTE — ANESTHESIA PROCEDURE NOTES
Adductor canal Procedure Note    Pre-Procedure   Staff -        Anesthesiologist:  Frantz Singer DO       Performed By: anesthesiologist       Location: pre-op       Procedure Start/Stop Times: 5/25/2022 6:46 AM and 5/25/2022 6:50 AM       Pre-Anesthestic Checklist: patient identified, IV checked, site marked, risks and benefits discussed, informed consent, monitors and equipment checked, pre-op evaluation, at physician/surgeon's request and post-op pain management  Timeout:       Correct Patient: Yes        Correct Procedure: Yes        Correct Site: Yes        Correct Position: Yes        Correct Laterality: Yes        Site Marked: Yes  Procedure Documentation  Procedure: Adductor canal       Diagnosis: POST OPERATIVE PAIN       Laterality: right       Patient Position: supine       Patient Prep/Sterile Barriers: sterile gloves, mask       Skin prep: Chloraprep       Needle Type: short bevel       Needle Gauge: 21.        Needle Length (millimeters): 110        Ultrasound guided       1. Ultrasound was used to identify targeted nerve, plexus, vascular marker, or fascial plane and place a needle adjacent to it in real-time.       2. Ultrasound was used to visualize the spread of anesthetic in close proximity to the above referenced structure.       3. A permanent image is entered into the patient's record.       4. The visualized anatomic structures appeared normal.       5. There were no apparent abnormal pathologic findings.    Assessment/Narrative         The placement was negative for: blood aspirated, painful injection and site bleeding       Paresthesias: No.       Bolus given via needle..        Secured via.        Insertion/Infusion Method: Single Shot       Complications: none       Injection made incrementally with aspirations every 5 mL.    Medication(s) Administered   Bupivacaine 0.25% PF (Infiltration) - Infiltration   15 mL - 5/25/2022 6:46:00 AM  Dexamethasone 10 mg/mL PF (Perineural) -  Perineural   1 mg - 5/25/2022 6:46:00 AM  Dexmedetomidine 4 mcg/mL (Perineural) - Perineural   20 mcg - 5/25/2022 6:46:00 AM  Medication Administration Time: 5/25/2022 6:46 AM     Comments:  Informed consent obtained.  All risks and benefits of the nerve block discussed with the patient.  All questions answered and all parties agreed with the plan.   Block was placed at the surgeon's request for post operative pain control.

## 2022-05-25 NOTE — DISCHARGE INSTRUCTIONS
[facility]   Adult Discharge Orders & Instructions     For 24 hours after surgery    Get plenty of rest.  A responsible adult must stay with you for at least 24 hours after you leave the hospital.   Do not drive or use heavy equipment.  If you have weakness or tingling, don't drive or use heavy equipment until this feeling goes away.  Do not drink alcohol.  Avoid strenuous or risky activities.  Ask for help when climbing stairs.   You may feel lightheaded.  IF so, sit for a few minutes before standing.  Have someone help you get up.   If you have nausea (feel sick to your stomach): Drink only clear liquids such as apple juice, ginger ale, broth or 7-Up.  Rest may also help.  Be sure to drink enough fluids.  Move to a regular diet as you feel able.  You may have a slight fever. Call the doctor if your fever is over 100 F (37.7 C) (taken under the tongue) or lasts longer than 24 hours.  You may have a dry mouth, a sore throat, muscle aches or trouble sleeping.  These should go away after 24 hours.  Do not make important or legal decisions.   Call your doctor for any of the followin.  Signs of infection (fever, growing tenderness at the surgery site, a large amount of drainage or bleeding, severe pain, foul-smelling drainage, redness, swelling).    2. It has been over 8 to 10 hours since surgery and you are still not able to urinate (pass water).    3.  Headache for over 24 hours.    4.  Numbness, tingling or weakness the day after surgery (if you had spinal anesthesia).  To contact a doctor, call     Safety Tips for Using Crutches    Crutch Fit:  Assume good standing posture with shoulders relaxed and crutch tips 6-8 inches out from the side of the foot.  The underarm pad should fall 2-3 fingers width below the armpit.  The handgrip is positioned level with the wrist to allow 30  flexion at the elbow.    Safety Tips:  Bear weight on your hands, not on your armpits.  Do not add extra padding to  "the underarm pad. This will, in effect, lengthen the crutches and increase risk of nerve injury.  Wear flat, properly fitting shoes. Do not walk in stocking feet, high heels or slippers.  Household hazards:  --Throw rugs should be removed from floors.  --Stairs should be cleared of obstacles.  --Use extra caution on slippery, highly polished, littered or uneven floor surfaces.  --Check for electric cords.  Check crutch tips for excessive wear and keep wing nuts tight.  While walking, look forward with  head up  and  eyes open.  Take equal length steps.  Use BOTH crutches.    Stairs Sequence:  UP: \"Good\" leg first, followed by  bad  leg, then crutches.  DOWN: Crutches, followed by  bad  leg, \"good\" leg.     Walking with Crutches:  Move both crutches forward at the same time.  Non-Weight Bearing (NWB):  Hold the involved leg up and swing through the crutches with the involved leg. The involved leg does not touch the floor.  Toe Touch Weight Bearing (TTWB): Move the involved leg forward. Rest it lightly on the floor for balance only. Step through the crutches with the uninvolved leg.  Partial Weight Bearing (PWB): Move the involved leg forward. Step down the weight of the leg only.  Step through the crutches with the uninvolved leg.  Weight Bearing As Tolerated (WBAT): Move the involved leg forward. Put as much pressure through the involved leg as you can tolerate comfortably. Then step through the crutches with the uninvolved leg.    Rev. 4/2014     "

## 2022-06-05 ENCOUNTER — HEALTH MAINTENANCE LETTER (OUTPATIENT)
Age: 52
End: 2022-06-05

## 2022-06-10 ENCOUNTER — OFFICE VISIT (OUTPATIENT)
Dept: ORTHOPEDICS | Facility: CLINIC | Age: 52
End: 2022-06-10
Payer: COMMERCIAL

## 2022-06-10 DIAGNOSIS — Z98.890 STATUS POST SURGERY: Primary | ICD-10-CM

## 2022-06-10 DIAGNOSIS — S86.011A ACHILLES TENDON TEAR, RIGHT, INITIAL ENCOUNTER: ICD-10-CM

## 2022-06-10 PROCEDURE — 29405 APPL SHORT LEG CAST: CPT | Mod: 58

## 2022-06-10 PROCEDURE — 99024 POSTOP FOLLOW-UP VISIT: CPT

## 2022-06-10 NOTE — PROGRESS NOTES
Reason for visit:    Sheldon Mathis came in to the clinic for a two week post op check.    His surgery was done 5/25/22 by Dr Merchant.  He had a Right Achilles tendon debridement, right calcaneus partial excision, and right achilles tendon reattachment.     Assessment:    Sheldon came into the clinic in a short leg cast Non-WB on a rolling knee scooter.     The cast was removed, surgical wounds were exposed and found to be well-healed; so the sutures were removed. Skin is c/d/i. Steri strips were securely applied. Master is doing well overall. Minimal swelling noted.    Plan:     He was placed into a new short leg cast in slightly more dorsiflexion.  He was told to remain Non-WB.     He will see me in two weeks for a cast change in a slightly more dorsiflexed position. He has an appointment to see Dr. Merchant at 6 weeks post op and at that time Dr. Merchant will determine further restrictions.    He has our phone number and will call with questions or problems.    Elsy Crawley [facility]  Cast/splint application    Date/Time: 6/10/2022 10:21 AM  Performed by: Nurse,  [facility]  Authorized by: Gokul Merchant MD     Consent:     Consent obtained:  Verbal    Consent given by:  Patient  Pre-procedure details:     Sensation:  Normal  Procedure details:     Laterality:  Right    Location:  Ankle    Ankle:  R ankle    Cast type:  Short leg    Supplies:  Fiberglass  Post-procedure details:     Pain:  Unchanged    Sensation:  Normal    Patient tolerance of procedure:  Tolerated well, no immediate complications    Patient provided with cast or splint care instructions: Yes

## 2022-06-24 ENCOUNTER — OFFICE VISIT (OUTPATIENT)
Dept: ORTHOPEDICS | Facility: CLINIC | Age: 52
End: 2022-06-24
Payer: COMMERCIAL

## 2022-06-24 DIAGNOSIS — Z98.890 STATUS POST SURGERY: Primary | ICD-10-CM

## 2022-06-24 PROCEDURE — 29405 APPL SHORT LEG CAST: CPT | Mod: 58

## 2022-06-24 PROCEDURE — 99024 POSTOP FOLLOW-UP VISIT: CPT

## 2022-06-24 NOTE — PROGRESS NOTES
Cast removal:    Relevant Diagnosis: Right achilles debridement and partial calcaneus osteotomy    Patient educated on cast removal process: Yes     Right short leg cast was removed per physician instruction.    Skin was observed and found to be intact with no signs of concern:Yes     Concern noted: None     Person(s) involved in removal:   Patient     Questions asked: None    Patient sent to x-ray: NA  Cast/splint application    Date/Time: 6/24/2022 12:25 PM  Performed by: Elsy Crawley ATC  Authorized by: Gokul Merchant MD     Consent:     Consent obtained:  Verbal    Consent given by:  Patient  Pre-procedure details:     Sensation:  Normal  Procedure details:     Laterality:  Right    Location:  Ankle    Ankle:  R ankle    Cast type:  Short leg    Supplies:  Fiberglass  Post-procedure details:     Pain:  Unchanged    Sensation:  Normal    Patient tolerance of procedure:  Tolerated well, no immediate complications    Patient provided with cast or splint care instructions: Yes    Comments:      Cast was applied with the ankle in slightly more dorsiflexion than before. Patient stated he could feel a stretch in the back of the ankle but it was not painful. The last cast was uncomfortable around the medial malleolus, so this was very well padded for the new cast. Master continues to do well.

## 2022-07-12 ENCOUNTER — OFFICE VISIT (OUTPATIENT)
Dept: ORTHOPEDICS | Facility: CLINIC | Age: 52
End: 2022-07-12
Payer: COMMERCIAL

## 2022-07-12 ENCOUNTER — THERAPY VISIT (OUTPATIENT)
Dept: PHYSICAL THERAPY | Facility: CLINIC | Age: 52
End: 2022-07-12
Payer: COMMERCIAL

## 2022-07-12 DIAGNOSIS — Z98.890 STATUS POST SURGERY: Primary | ICD-10-CM

## 2022-07-12 DIAGNOSIS — Z98.890 STATUS POST SURGERY: ICD-10-CM

## 2022-07-12 DIAGNOSIS — M25.571 PAIN IN JOINT, ANKLE AND FOOT, RIGHT: ICD-10-CM

## 2022-07-12 PROCEDURE — 99024 POSTOP FOLLOW-UP VISIT: CPT | Performed by: ORTHOPAEDIC SURGERY

## 2022-07-12 PROCEDURE — 97110 THERAPEUTIC EXERCISES: CPT | Mod: GP | Performed by: PHYSICAL THERAPIST

## 2022-07-12 PROCEDURE — 97161 PT EVAL LOW COMPLEX 20 MIN: CPT | Mod: GP | Performed by: PHYSICAL THERAPIST

## 2022-07-12 NOTE — PROGRESS NOTES
DME FITTING    Relevant Diagnosis: Right achilles debridement and calcaneus osteotomy  Right tall walking boot was fit on patient's Right ankle/foot.     Person(s) involved in teaching:   Patient    Brace was applied in standard Manner:  Yes  Brace fit well:  Yes  Patient reports brace to fit comfortably:  Yes    Education:   Patient shown self application and removal of brace: Yes  Patient shown how to adjust brace fit, if necessary: Yes  Patient educated on billing and return policy: Yes  Patient confirmed understanding when and how to contact clinic with concerns: Yes    Elsy Crawley ATC

## 2022-07-12 NOTE — PROGRESS NOTES
Physical Therapy Initial Evaluation  Subjective:  The history is provided by the patient. No  was used.   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  Status post right Achilles tendon debridement with partial calcaneus excision and subsequent reattachment performed on 05/25/2022. Sx x6 months sx prior to surgery.  Has flexibility for how much standing/walking he will need to do when returns to work on 7/25/2022. AROM R ankle DF 0, PF 10, inv 10, ever 4. Isometric mm activity into each of these 4 motions good without pain. Gait without arm swing, decreased stance time on R and increased lateral trunk lean.  MD orders for isometrics, match DF to contralateral side (AROM L DF 3)  and is able to WBAT with CAM, sleep and shower [place]         Type of problem:  Right ankle.    This is a new condition.  Condition occurred with:  Repetition/overuse and other reason (bone spur).  Where condition occurred: for unknown reasons.  Patient reports pain:  Medial calcaneal tuberosity and lower leg.  Pain is described as aching and is intermittent.  Pain radiates to:  No radiation.     Symptoms are exacerbated by activity      Previous treatment includes surgery (5/25/2022 ).                           Objective:  System    Ankle/Foot Evaluation  ROM:    AROM:    Dorsiflexion:  Left:   3  Right:   0  Plantarflexion:  Left:  40    Right:  10  Inversion:  Left:  35     Right:  10  Eversion:  15     Right:  3          LIGAMENT TESTING: not assessed              SPECIAL TESTS: not assessed      EDEMA: Edema ankle: surgical scar location edema mild           MOBILITY TESTING: not assessed              FUNCTIONAL TESTS: Functional test ankle: gait with decr stance time R, increased R trunk lateral leaning and no arm swing present.                                                              General      ROS    Assessment/Plan:    Patient is a 52 year old male with right side ankle complaints.    Patient has the following significant findings with corresponding treatment plan.                Diagnosis 1:  Ankle pain S/P calcaneal excision/Achilles debridement  Pain -  hot/cold therapy, manual therapy, STS, splint/taping/bracing/orthotics, self management, education, directional preference exercise and home program  Decreased ROM/flexibility - manual therapy and therapeutic exercise  Decreased joint mobility - manual therapy and therapeutic exercise  Decreased strength - therapeutic exercise and therapeutic activities  Impaired balance - neuro re-education and therapeutic activities  Decreased proprioception - neuro re-education and therapeutic activities  Impaired gait - gait training  Impaired muscle performance - neuro re-education  Decreased function - therapeutic activities      Cumulative Therapy Evaluation is: Low complexity.    Previous and current functional limitations:  (See Goal Flow Sheet for this information)    Short term and Long term goals: (See Goal Flow Sheet for this information)     Communication ability:  Patient appears to be able to clearly communicate and understand verbal and written communication and follow directions correctly.  Treatment Explanation - The following has been discussed with the patient:   RX ordered/plan of care  Anticipated outcomes  Possible risks and side effects  This patient would benefit from PT intervention to resume normal activities.   Rehab potential is excellent.    Frequency:  1 X a month, once daily until able to progress past restrictions then 1 X a week x 8 weeks  Duration:  for 3 months  Discharge Plan:  Achieve all LTG.  Independent in home treatment program.  Reach maximal therapeutic benefit.    Please refer to the daily flowsheet for treatment today, total treatment time and time spent performing 1:1 timed codes.

## 2022-07-12 NOTE — LETTER
7/12/2022         RE: Sheldon Mathis  [address]  [facility] [address]        Dear Colleague,    Thank you for referring your patient, Sheldon Mathis, to the [facility] [place]. Please see a copy of my visit note below.    DME FITTING    Relevant Diagnosis: Right achilles debridement and calcaneus osteotomy  Right tall walking boot was fit on patient's Right ankle/foot.     Person(s) involved in teaching:   Patient    Brace was applied in standard Manner:  Yes  Brace fit well:  Yes  Patient reports brace to fit comfortably:  Yes    Education:   Patient shown self application and removal of brace: Yes  Patient shown how to adjust brace fit, if necessary: Yes  Patient educated on billing and return policy: Yes  Patient confirmed understanding when and how to contact clinic with concerns: Yes    Elsy Crawley ATC      CHIEF COMPLAINT:  Status post right Achilles tendon debridement with partial calcaneus excision and subsequent reattachment performed on 05/25/2022.    HISTORY OF PRESENT ILLNESS:  Mr. Mathis presents today for further followup.  Reports to be compliant.  Reports to be doing well.  Denies to have any problems.    PHYSICAL EXAMINATION:  On today's visit, he presents with motion from neutral and to 30 degrees of plantar flexion.  There is excellent tension across the Achilles tendon.  There is a well-healed surgical incision.  He presents with a very slight maceration of the skin, which is very superficial.    ASSESSMENT:  Status post right Achilles tendon debridement with partial calcaneus excision and subsequent reattachment.    PLAN:  Discussed with the patient that he can proceed with activities as tolerated with the use of the CAM Walker.  The CAM Walker will be utilized at all times except for hygiene, resting, sitting and sleeping activities.  The patient will proceed with physical therapy for isometric strengthening and to match the dorsiflexion of  the opposite side.  The patient will follow up in 6 weeks from now, and at that time, no x-rays will be obtained.      Gokul Merchant MD

## 2022-07-12 NOTE — NURSING NOTE
Reason For Visit:   Chief Complaint   Patient presents with     RECHECK     Right achilles debridement and partial calcaneus osteotomy DOS 5/25/22       There were no vitals taken for this visit.         Elsy Crawley, ATC

## 2022-07-12 NOTE — PROGRESS NOTES
CHIEF COMPLAINT:  Status post right Achilles tendon debridement with partial calcaneus excision and subsequent reattachment performed on 05/25/2022.    HISTORY OF PRESENT ILLNESS:  Mr. Mathis presents today for further followup.  Reports to be compliant.  Reports to be doing well.  Denies to have any problems.    PHYSICAL EXAMINATION:  On today's visit, he presents with motion from neutral and to 30 degrees of plantar flexion.  There is excellent tension across the Achilles tendon.  There is a well-healed surgical incision.  He presents with a very slight maceration of the skin, which is very superficial.    ASSESSMENT:  Status post right Achilles tendon debridement with partial calcaneus excision and subsequent reattachment.    PLAN:  Discussed with the patient that he can proceed with activities as tolerated with the use of the CAM Walker.  The CAM Walker will be utilized at all times except for hygiene, resting, sitting and sleeping activities.  The patient will proceed with physical therapy for isometric strengthening and to match the dorsiflexion of the opposite side.  The patient will follow up in 6 weeks from now, and at that time, no x-rays will be obtained.

## 2022-08-22 DIAGNOSIS — M25.562 LEFT KNEE PAIN, UNSPECIFIED CHRONICITY: Primary | ICD-10-CM

## 2022-08-23 ENCOUNTER — THERAPY VISIT (OUTPATIENT)
Dept: PHYSICAL THERAPY | Facility: CLINIC | Age: 52
End: 2022-08-23
Payer: COMMERCIAL

## 2022-08-23 ENCOUNTER — ANCILLARY PROCEDURE (OUTPATIENT)
Dept: GENERAL RADIOLOGY | Facility: CLINIC | Age: 52
End: 2022-08-23
Attending: ORTHOPAEDIC SURGERY
Payer: COMMERCIAL

## 2022-08-23 ENCOUNTER — OFFICE VISIT (OUTPATIENT)
Dept: ORTHOPEDICS | Facility: CLINIC | Age: 52
End: 2022-08-23
Payer: COMMERCIAL

## 2022-08-23 VITALS — BODY MASS INDEX: 43.64 KG/M2 | HEIGHT: 71 IN | WEIGHT: 311.7 LBS

## 2022-08-23 DIAGNOSIS — M25.562 LEFT KNEE PAIN, UNSPECIFIED CHRONICITY: ICD-10-CM

## 2022-08-23 DIAGNOSIS — E66.01 MORBID OBESITY (H): ICD-10-CM

## 2022-08-23 DIAGNOSIS — Z98.890 STATUS POST SURGERY: Primary | ICD-10-CM

## 2022-08-23 DIAGNOSIS — M17.12 ARTHRITIS OF LEFT KNEE: Primary | ICD-10-CM

## 2022-08-23 PROCEDURE — 97530 THERAPEUTIC ACTIVITIES: CPT | Mod: GP | Performed by: PHYSICAL THERAPIST

## 2022-08-23 PROCEDURE — 97016 VASOPNEUMATIC DEVICE THERAPY: CPT | Mod: GP | Performed by: PHYSICAL THERAPIST

## 2022-08-23 PROCEDURE — 97140 MANUAL THERAPY 1/> REGIONS: CPT | Mod: 59 | Performed by: PHYSICAL THERAPIST

## 2022-08-23 PROCEDURE — 99212 OFFICE O/P EST SF 10 MIN: CPT | Mod: 25 | Performed by: ORTHOPAEDIC SURGERY

## 2022-08-23 PROCEDURE — 20610 DRAIN/INJ JOINT/BURSA W/O US: CPT | Mod: LT | Performed by: ORTHOPAEDIC SURGERY

## 2022-08-23 PROCEDURE — 97110 THERAPEUTIC EXERCISES: CPT | Mod: 59 | Performed by: PHYSICAL THERAPIST

## 2022-08-23 PROCEDURE — 73560 X-RAY EXAM OF KNEE 1 OR 2: CPT | Mod: LT | Performed by: RADIOLOGY

## 2022-08-23 RX ORDER — TRIAMCINOLONE ACETONIDE 40 MG/ML
40 INJECTION, SUSPENSION INTRA-ARTICULAR; INTRAMUSCULAR
Status: SHIPPED | OUTPATIENT
Start: 2022-08-23

## 2022-08-23 RX ORDER — LIDOCAINE HYDROCHLORIDE 10 MG/ML
9 INJECTION, SOLUTION EPIDURAL; INFILTRATION; INTRACAUDAL; PERINEURAL
Status: SHIPPED | OUTPATIENT
Start: 2022-08-23

## 2022-08-23 RX ADMIN — LIDOCAINE HYDROCHLORIDE 9 ML: 10 INJECTION, SOLUTION EPIDURAL; INFILTRATION; INTRACAUDAL; PERINEURAL at 10:10

## 2022-08-23 RX ADMIN — TRIAMCINOLONE ACETONIDE 40 MG: 40 INJECTION, SUSPENSION INTRA-ARTICULAR; INTRAMUSCULAR at 10:10

## 2022-08-23 ASSESSMENT — KOOS JR
TWISING OR PIVOTING ON KNEE: MODERATE
KOOS JR SCORING: 52.47
GOING UP OR DOWN STAIRS: MODERATE
STRAIGHTENING KNEE FULLY: MODERATE
HOW SEVERE IS YOUR KNEE STIFFNESS AFTER FIRST WAKING IN MORNING: MODERATE
BENDING TO THE FLOOR TO PICK UP OBJECT: MODERATE
STANDING UPRIGHT: MODERATE
RISING FROM SITTING: MODERATE

## 2022-08-23 NOTE — PROGRESS NOTES
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 Rodrigez 5/25/2022.  This necessitated him being on a wheeled scooter for 6 weeks followed by wearing a boot.  He just got off of the boot 1 week ago.  Wearing the boot placed his feet in an asymmetric differential in regards to height.    During this time he developed anterior medial knee pain.  This seemed to resolve somewhat with getting off the scooter and a little bit more when he got out of the boot.  However it still creates daily pain particularly when he first gets up in the morning or when he is standing or walking for periods of time.    Physical exam: BMI 43.5    Examination of patient's left knee reveals no knee swelling.  Good straight leg raising effort without a lag.  Range of motion 0-1 35.  Kneecap tracks well through an active arc of motion.    Palpable discomfort anterior medially at the joint line.  No pain to circumduction maneuvers.    Patella mobility 1 quadrant medial mobility 1+ quadrant lateral mobility firm endpoints.  Negative medial patella tilt test    No pain over the patella tendon proper    Imaging: Standing alignment views reveal bilateral varus alignment, weightbearing line falling in zone 1 medial.    Sagittal view unremarkable no.  Axial views reveal positive patella tilt with early lateral patellofemoral compartment narrowing left greater than right.    Assessment: Likely aggravation of early medial compartment wear.  Possible patellofemoral discomfort including patella tendinopathy based on history but less convincing physical exam    Plan: Given the  longevity of his discomfort I felt the best way to treat this would be to inject his knee with steroids today.    If this does not completely resolve his pain we will try a lateral to medial patella tape technique as he is already going to therapist for his foot and ankle.    If pain continues to be unresolved an MRI will be obtained, left knee.    Room time 20 minutes consultation time 10    Leny Alvarez MD  Professor Orthopedic Surgery  [facility]

## 2022-08-23 NOTE — LETTER
8/23/2022         RE: Sheldon Mathis  [address]  [facility] [facility] [address]        Dear Colleague,    Thank you for referring your patient, Sheldon Mathis, to the [facility] [place]. Please see a copy of my visit note below.    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 Rodrigez 5/25/2022.  This necessitated him being on a wheeled scooter for 6 weeks followed by wearing a boot.  He just got off of the boot 1 week ago.  Wearing the boot placed his feet in an asymmetric differential in regards to height.    During this time he developed anterior medial knee pain.  This seemed to resolve somewhat with getting off the scooter and a little bit more when he got out of the boot.  However it still creates daily pain particularly when he first gets up in the morning or when he is standing or walking for periods of time.    Physical exam: BMI 43.5    Examination of patient's left knee reveals no knee swelling.  Good straight leg raising effort without a lag.  Range of motion 0-1 35.  Kneecap tracks well through an active arc of motion.    Palpable discomfort anterior medially at the joint line.  No pain to circumduction maneuvers.    Patella mobility 1 quadrant medial mobility 1+ quadrant lateral mobility firm endpoints.  Negative medial patella tilt test    No pain over the patella tendon proper    Imaging: Standing alignment views reveal bilateral varus alignment, weightbearing line falling in zone 1 medial.    Sagittal view unremarkable no.  Axial views reveal positive patella tilt with early lateral  patellofemoral compartment narrowing left greater than right.    Assessment: Likely aggravation of early medial compartment wear.  Possible patellofemoral discomfort including patella tendinopathy based on history but less convincing physical exam    Plan: Given the longevity of his discomfort I felt the best way to treat this would be to inject his knee with steroids today.    If this does not completely resolve his pain we will try a lateral to medial patella tape technique as he is already going to therapist for his foot and ankle.    If pain continues to be unresolved an MRI will be obtained, left knee.    Room time 20 minutes consultation time 10    Leny Alvarez MD  Professor Orthopedic Surgery  [facility]    Large Joint Injection/Arthocentesis: L knee joint    Date/Time: 8/23/2022 10:10 AM  Performed by: Leny Alvarez MD  Authorized by: Leny Alvarez MD     Indications:  Pain and osteoarthritis  Needle Size:  21 G  Guidance: landmark guided    Approach:  Medial  Location:  Knee      Medications:  40 mg triamcinolone 40 MG/ML; 9 mL lidocaine (PF) 1 %  Outcome:  Tolerated well, no immediate complications  Procedure discussed: discussed risks, benefits, and alternatives    Consent Given by:  Patient  Timeout: timeout called immediately prior to procedure    Prep: patient was prepped and draped in usual sterile fashion     HISTORY OF PRESENT ILLNESS:  Sheldon Mathis is a 52 year old male with knee pain.  Diagnosis is knee arthritis supported by history, physical exam, and imaging.    PROCEDURE:  After informed verbal and writtten consent, under sterile conditions, patient's left knee was injected with 9 cc of Lidocaine and 1 cc of Kenalog (40 mg/ml).   The injection was done by Dr. Alvarez.    Patient had good pain relief upon leaving the clinic, and will follow up with us on an as needed basis.        [facility] [address]  [facility] [address]  [phone]  Dept: [phone]  ______________________________________________________________________________    Patient: Sheldon Mathis   : 1970   MRN: [id number]   2022    INVASIVE PROCEDURE SAFETY CHECKLIST    Date: 2022   Procedure:Left knee steroid injection  Patient Name: Sheldon Mathis  MRN: [id number]  YOB: 1970    Action: Complete sections as appropriate. Any discrepancy results in a HARD COPY until resolved.     PRE PROCEDURE:  Patient ID verified with 2 identifiers (name and  or MRN): Yes  Procedure and site verified with patient/designee (when able): Yes  Accurate consent documentation in medical record: Yes  H&P (or appropriate assessment) documented in medical record: NA  H&P must be up to 20 days prior to procedure and updates within 24 hours of procedure as applicable: NA  Relevant diagnostic and radiology test results appropriately labeled and displayed as applicable: Yes  Procedure site(s) marked with provider initials: NA    TIMEOUT:  Time-Out performed immediately prior to starting procedure, including verbal and active participation of all team members addressing the following:Yes  * Correct patient identify  * Confirmed that the correct side and site are marked  * An accurate procedure consent form  * Agreement on the procedure to be done  * Correct patient position  * Relevant images and results are properly labeled and appropriately displayed  * The need to administer antibiotics or fluids for irrigation purposes during the procedure as applicable   * Safety precautions based on patient history or medication use    DURING PROCEDURE: Verification of correct person, site, and procedures any time the responsibility for care of the patient is transferred to another member of the care team.       The following medications were given:         Prior to injection, verified patient identity using patient's name and  date of birth.  Due to injection administration, patient instructed to remain in clinic for 15 minutes  afterwards, and to report any adverse reaction to me immediately.    Joint injection was performed.    Medication Name: Lidocaine NDC 86617-605-04  Drug Amount Wasted:  Yes: 11 mg/ml   Vial/Syringe: Single dose vial  Expiration Date:  03/26    Medication Name Fidel NDC [address]-7    Scribed by Trudi Fung ATC for Dr. Alvarez on August 23, 2022 at 10:12a based on the provider's statements to me.     ERICK Ordoñez MD  Professor Orthopedic Surgery  [facility]

## 2022-08-23 NOTE — PROGRESS NOTES
Large Joint Injection/Arthocentesis: L knee joint    Date/Time: 2022 10:10 AM  Performed by: Leny Alvarez MD  Authorized by: Leny Alvarez MD     Indications:  Pain and osteoarthritis  Needle Size:  21 G  Guidance: landmark guided    Approach:  Medial  Location:  Knee      Medications:  40 mg triamcinolone 40 MG/ML; 9 mL lidocaine (PF) 1 %  Outcome:  Tolerated well, no immediate complications  Procedure discussed: discussed risks, benefits, and alternatives    Consent Given by:  Patient  Timeout: timeout called immediately prior to procedure    Prep: patient was prepped and draped in usual sterile fashion     HISTORY OF PRESENT ILLNESS:  Sheldon Mathis is a 52 year old male with knee pain.  Diagnosis is knee arthritis supported by history, physical exam, and imaging.    PROCEDURE:  After informed verbal and writtten consent, under sterile conditions, patient's left knee was injected with 9 cc of Lidocaine and 1 cc of Kenalog (40 mg/ml).   The injection was done by Dr. Alvarez.    Patient had good pain relief upon leaving the clinic, and will follow up with us on an as needed basis.        [facility] [address] [id number] [phone]  Dept: [phone]  ______________________________________________________________________________    Patient: Sheldon Mathis   : 1970   MRN: [id number]   2022    INVASIVE PROCEDURE SAFETY CHECKLIST    Date: 2022   Procedure:Left knee steroid injection  Patient Name: Sheldon Mathis  MRN: [id number]  YOB: 1970    Action: Complete sections as appropriate. Any discrepancy results in a HARD COPY until resolved.     PRE PROCEDURE:  Patient ID verified with 2 identifiers (name and  or MRN): Yes  Procedure and site verified with patient/designee (when able): Yes  Accurate consent documentation in medical record: Yes  H&P (or appropriate assessment)  documented in medical record: NA  H&P must be up to 20 days prior to procedure and updates within 24 hours of procedure as applicable: NA  Relevant diagnostic and radiology test results appropriately labeled and displayed as applicable: Yes  Procedure site(s) marked with provider initials: NA    TIMEOUT:  Time-Out performed immediately prior to starting procedure, including verbal and active participation of all team members addressing the following:Yes  * Correct patient identify  * Confirmed that the correct side and site are marked  * An accurate procedure consent form  * Agreement on the procedure to be done  * Correct patient position  * Relevant images and results are properly labeled and appropriately displayed  * The need to administer antibiotics or fluids for irrigation purposes during the procedure as applicable   * Safety precautions based on patient history or medication use    DURING PROCEDURE: Verification of correct person, site, and procedures any time the responsibility for care of the patient is transferred to another member of the care team.       The following medications were given:         Prior to injection, verified patient identity using patient's name and date of birth.  Due to injection administration, patient instructed to remain in clinic for 15 minutes  afterwards, and to report any adverse reaction to me immediately.    Joint injection was performed.    Medication Name: Lidocaine NDC 73624-446-10  Drug Amount Wasted:  Yes: 11 mg/ml   Vial/Syringe: Single dose vial  Expiration Date:  03/26    Medication Name Kenolog NDC [address]-7    Scribed by Trudi Fung ATC for Dr. Alvarez on August 23, 2022 at 10:12a based on the provider's statements to me.     ERICK Ordoñez MD  Professor Orthopedic Surgery  [facility]

## 2022-08-23 NOTE — PROGRESS NOTES
PROGRESS  REPORT    Progress reporting period is from 7/12/22 to 8/23/22.       SUBJECTIVE   Subjective: Patient returns after six weeks.  He saw Dr. Mayur nicole who cleared him to be out of his boot without restrictions.  Patient notes that swelling is his biggest challenge.    Initial Pain level: 1/10.   Changes in function:  Yes (See Goal flowsheet attached for changes in current functional level)  Adverse reaction to treatment or activity: None    OBJECTIVE  Changes noted in objective findings:  Yes  Objective: PROM Ankle: Df 0 on R, 5 on L; PF 55 on R, 65 on L; INV 30 on R, 35 on L; INV 20 on R, 30 on L.  Figure 8 circumference: 62 cm on R, 60 cm on L.  1+ pitting edema     ASSESSMENT/PLAN  Updated problem list and treatment plan: Diagnosis 1:  S/p Achilles debridement, calcaneal osteotomy    Pain -  hot/cold therapy, manual therapy, splint/taping/bracing/orthotics, self management, education and home program  Decreased ROM/flexibility - manual therapy and therapeutic exercise  Decreased joint mobility - manual therapy and therapeutic exercise  Decreased strength - therapeutic exercise and therapeutic activities  Impaired balance - neuro re-education and therapeutic activities  Decreased proprioception - neuro re-education and therapeutic activities  Inflammation - cold therapy  Edema - vasopneumatics  Impaired gait - gait training  Impaired muscle performance - neuro re-education  Decreased function - therapeutic activities  STG/LTGs have been met or progress has been made towards goals:  Yes (See Goal flow sheet completed today.)  Assessment of Progress: The patient's condition is improving.  Self Management Plans:  Patient has been instructed in a home treatment program.  I have re-evaluated this patient and find that the nature, scope, duration and intensity of the therapy is appropriate for the medical condition of the patient.  Sheldon continues to require the following intervention to meet STG and  LTG's:  PT    Recommendations:  This patient would benefit from continued therapy.     Frequency:  1 X week, once daily  Duration:  for 6 weeks        Please refer to the daily flowsheet for treatment today, total treatment time and time spent performing 1:1 timed codes.        Answers for HPI/ROS submitted by the patient on 8/23/2022  Reason for Visit:: PT post Achilles surgery.  Boot removed 8/16  When problem began:: 5/25/2022  How problem occurred:: Bone spur  Number scale: 3/10  General health as reported by patient: fair  Please check all that apply to your current or past medical history: high blood pressure, overweight  Medical allergies: other  Other Allergies Detail: Medicine  Surgeries: orthopedic surgery, other  Other Surgery Detail: Sinus  Medications you are currently taking: anti-inflammatory, high blood pressure medication, other  Other Meds Detail: Gastric reflux and cholesterol  Occupation:: RN  What are your primary job tasks: computer work, pushing/pulling, repetitive tasks

## 2022-08-23 NOTE — NURSING NOTE
"Reason For Visit:   Chief Complaint   Patient presents with     Consult     Left knee pain       Primary MD: Julio Cesar Moses  Referring MD: self    ?  No  Occupation  at [employer].  Currently working? Yes.  Work status?  Full time.  Date of injury: none, pain started after he got his right foot boot after surgery.  He started to have a leg length discrepancy.    Date of surgery: NA  Type of surgery: NA.  Smoker: No  Request smoking cessation information: No    Ht 1.801 m (5' 10.91\")   Wt 141.4 kg (311 lb 11.2 oz)   BMI 43.59 kg/m      Pain Assessment  Patient Currently in Pain: Yes  0-10 Pain Scale: 2  Primary Pain Location: Knee (Left)  Pain Descriptors: Tender, Aching, Constant  Alleviating Factors: Rest, Ice  Aggravating Factors: Walking, Movement    "

## 2022-08-30 ENCOUNTER — THERAPY VISIT (OUTPATIENT)
Dept: PHYSICAL THERAPY | Facility: CLINIC | Age: 52
End: 2022-08-30
Payer: COMMERCIAL

## 2022-08-30 DIAGNOSIS — Z47.89 AFTERCARE FOLLOWING SURGERY OF THE MUSCULOSKELETAL SYSTEM: ICD-10-CM

## 2022-08-30 DIAGNOSIS — M25.571 CHRONIC PAIN OF RIGHT ANKLE: ICD-10-CM

## 2022-08-30 DIAGNOSIS — G89.29 CHRONIC PAIN OF RIGHT ANKLE: ICD-10-CM

## 2022-08-30 PROCEDURE — 97110 THERAPEUTIC EXERCISES: CPT | Mod: GP | Performed by: PHYSICAL THERAPIST

## 2022-08-30 PROCEDURE — 97140 MANUAL THERAPY 1/> REGIONS: CPT | Mod: GP | Performed by: PHYSICAL THERAPIST

## 2022-09-06 ENCOUNTER — THERAPY VISIT (OUTPATIENT)
Dept: PHYSICAL THERAPY | Facility: CLINIC | Age: 52
End: 2022-09-06
Payer: COMMERCIAL

## 2022-09-06 DIAGNOSIS — M25.571 CHRONIC PAIN OF RIGHT ANKLE: Primary | ICD-10-CM

## 2022-09-06 DIAGNOSIS — Z47.89 AFTERCARE FOLLOWING SURGERY OF THE MUSCULOSKELETAL SYSTEM: ICD-10-CM

## 2022-09-06 DIAGNOSIS — G89.29 CHRONIC PAIN OF RIGHT ANKLE: Primary | ICD-10-CM

## 2022-09-06 PROCEDURE — 97140 MANUAL THERAPY 1/> REGIONS: CPT | Mod: GP | Performed by: PHYSICAL THERAPIST

## 2022-09-06 PROCEDURE — 97110 THERAPEUTIC EXERCISES: CPT | Mod: GP | Performed by: PHYSICAL THERAPIST

## 2022-09-20 ENCOUNTER — THERAPY VISIT (OUTPATIENT)
Dept: PHYSICAL THERAPY | Facility: CLINIC | Age: 52
End: 2022-09-20
Payer: COMMERCIAL

## 2022-09-20 DIAGNOSIS — Z47.89 AFTERCARE FOLLOWING SURGERY OF THE MUSCULOSKELETAL SYSTEM: ICD-10-CM

## 2022-09-20 DIAGNOSIS — M25.571 CHRONIC PAIN OF RIGHT ANKLE: Primary | ICD-10-CM

## 2022-09-20 DIAGNOSIS — G89.29 CHRONIC PAIN OF RIGHT ANKLE: Primary | ICD-10-CM

## 2022-09-20 PROCEDURE — 97140 MANUAL THERAPY 1/> REGIONS: CPT | Mod: GP | Performed by: PHYSICAL THERAPIST

## 2022-09-20 PROCEDURE — 97110 THERAPEUTIC EXERCISES: CPT | Mod: GP | Performed by: PHYSICAL THERAPIST

## 2022-10-04 ENCOUNTER — THERAPY VISIT (OUTPATIENT)
Dept: PHYSICAL THERAPY | Facility: CLINIC | Age: 52
End: 2022-10-04
Payer: COMMERCIAL

## 2022-10-04 DIAGNOSIS — Z47.89 AFTERCARE FOLLOWING SURGERY OF THE MUSCULOSKELETAL SYSTEM: ICD-10-CM

## 2022-10-04 DIAGNOSIS — G89.29 CHRONIC PAIN OF RIGHT ANKLE: Primary | ICD-10-CM

## 2022-10-04 DIAGNOSIS — M25.571 CHRONIC PAIN OF RIGHT ANKLE: Primary | ICD-10-CM

## 2022-10-04 PROCEDURE — 97140 MANUAL THERAPY 1/> REGIONS: CPT | Mod: GP | Performed by: PHYSICAL THERAPIST

## 2022-10-04 PROCEDURE — 97110 THERAPEUTIC EXERCISES: CPT | Mod: GP | Performed by: PHYSICAL THERAPIST

## 2022-10-15 ENCOUNTER — HEALTH MAINTENANCE LETTER (OUTPATIENT)
Age: 52
End: 2022-10-15

## 2023-08-20 ENCOUNTER — HEALTH MAINTENANCE LETTER (OUTPATIENT)
Age: 53
End: 2023-08-20

## 2024-08-04 ENCOUNTER — HEALTH MAINTENANCE LETTER (OUTPATIENT)
Age: 54
End: 2024-08-04

## 2025-08-16 ENCOUNTER — HEALTH MAINTENANCE LETTER (OUTPATIENT)
Age: 55
End: 2025-08-16

## (undated) DEVICE — DRSG ABDOMINAL 07 1/2X8" 7197D

## (undated) DEVICE — SUCTION MANIFOLD NEPTUNE 2 SYS 4 PORT 0702-020-000

## (undated) DEVICE — SOL WATER IRRIG 1000ML BOTTLE 2F7114

## (undated) DEVICE — CAST PADDING 4" UNSTERILE 9044

## (undated) DEVICE — GLOVE PROTEXIS W/NEU-THERA 7.5  2D73TE75

## (undated) DEVICE — LIGHT HANDLE X1 31140133

## (undated) DEVICE — PACK LOWER EXTREMITY RIVERSIDE SOP32LEFSX

## (undated) DEVICE — TOURNIQUET CUFF 34" REPRO BROWN 60-7070-106

## (undated) DEVICE — CAST STOCKINETTE 4" COTTON 30-7004

## (undated) DEVICE — Device

## (undated) DEVICE — BLADE KNIFE SURG 10 371110

## (undated) DEVICE — BLADE SAW SAGITTAL STRK 13X90X1.27MM HD SYS 6 6113-127-090

## (undated) DEVICE — SOL NACL 0.9% IRRIG 1000ML BOTTLE 2F7124

## (undated) DEVICE — GLOVE PROTEXIS MICRO 6.5  2D73PM65

## (undated) DEVICE — SU VICRYL 2-0 CT-1 27" UND J259H

## (undated) DEVICE — STRAP KNEE/BODY 31143004

## (undated) DEVICE — CAST TAPE FIBERGLASS 3" ROLL WHITE 7033W

## (undated) DEVICE — SU ETHILON 3-0 PS-1 18" 1663H

## (undated) DEVICE — PACK SET-UP STD 9102

## (undated) DEVICE — ESU GROUND PAD ADULT W/CORD E7507

## (undated) DEVICE — LINEN ORTHO PACK 5446

## (undated) DEVICE — GLOVE PROTEXIS BLUE W/NEU-THERA 8.0  2D73EB80

## (undated) DEVICE — GLOVE PROTEXIS BLUE W/NEU-THERA 7.0  2D73EB70

## (undated) DEVICE — GOWN XLG DISP 9545

## (undated) DEVICE — IMM LEG ELEVATOR 79-90191

## (undated) RX ORDER — DEXAMETHASONE SODIUM PHOSPHATE 4 MG/ML
INJECTION, SOLUTION INTRA-ARTICULAR; INTRALESIONAL; INTRAMUSCULAR; INTRAVENOUS; SOFT TISSUE
Status: DISPENSED
Start: 2022-05-25

## (undated) RX ORDER — CEFAZOLIN SODIUM/WATER 3 G/30 ML
SYRINGE (ML) INTRAVENOUS
Status: DISPENSED
Start: 2022-05-25

## (undated) RX ORDER — FENTANYL CITRATE 50 UG/ML
INJECTION, SOLUTION INTRAMUSCULAR; INTRAVENOUS
Status: DISPENSED
Start: 2022-05-25

## (undated) RX ORDER — PROPOFOL 10 MG/ML
INJECTION, EMULSION INTRAVENOUS
Status: DISPENSED
Start: 2022-05-25

## (undated) RX ORDER — ONDANSETRON 2 MG/ML
INJECTION INTRAMUSCULAR; INTRAVENOUS
Status: DISPENSED
Start: 2022-05-25

## (undated) RX ORDER — LIDOCAINE HYDROCHLORIDE 20 MG/ML
INJECTION, SOLUTION EPIDURAL; INFILTRATION; INTRACAUDAL; PERINEURAL
Status: DISPENSED
Start: 2022-05-25

## (undated) RX ORDER — TRIAMCINOLONE ACETONIDE 40 MG/ML
INJECTION, SUSPENSION INTRA-ARTICULAR; INTRAMUSCULAR
Status: DISPENSED
Start: 2022-08-23

## (undated) RX ORDER — FENTANYL CITRATE-0.9 % NACL/PF 10 MCG/ML
PLASTIC BAG, INJECTION (ML) INTRAVENOUS
Status: DISPENSED
Start: 2022-05-25

## (undated) RX ORDER — LIDOCAINE HYDROCHLORIDE 10 MG/ML
INJECTION, SOLUTION INFILTRATION; PERINEURAL
Status: DISPENSED
Start: 2022-08-23